# Patient Record
Sex: MALE | Race: OTHER | HISPANIC OR LATINO | URBAN - METROPOLITAN AREA
[De-identification: names, ages, dates, MRNs, and addresses within clinical notes are randomized per-mention and may not be internally consistent; named-entity substitution may affect disease eponyms.]

---

## 2017-08-09 ENCOUNTER — EMERGENCY (EMERGENCY)
Facility: HOSPITAL | Age: 27
LOS: 1 days | Discharge: HOME | End: 2017-08-09
Attending: EMERGENCY MEDICINE | Admitting: EMERGENCY MEDICINE
Payer: SELF-PAY

## 2017-08-09 VITALS
HEART RATE: 90 BPM | OXYGEN SATURATION: 98 % | TEMPERATURE: 98 F | SYSTOLIC BLOOD PRESSURE: 140 MMHG | DIASTOLIC BLOOD PRESSURE: 80 MMHG | RESPIRATION RATE: 18 BRPM

## 2017-08-09 PROCEDURE — 99284 EMERGENCY DEPT VISIT MOD MDM: CPT

## 2017-08-09 NOTE — ED ADULT TRIAGE NOTE - CHIEF COMPLAINT QUOTE
p/t c/o of cyst to lt side for many years here for eval c/o of discomfort on cyst area for past few days nad noted

## 2017-08-30 ENCOUNTER — EMERGENCY (EMERGENCY)
Facility: HOSPITAL | Age: 27
LOS: 1 days | Discharge: HOME | End: 2017-08-30
Attending: EMERGENCY MEDICINE | Admitting: EMERGENCY MEDICINE
Payer: SELF-PAY

## 2017-08-30 VITALS
OXYGEN SATURATION: 99 % | TEMPERATURE: 98 F | RESPIRATION RATE: 18 BRPM | HEART RATE: 62 BPM | DIASTOLIC BLOOD PRESSURE: 71 MMHG | SYSTOLIC BLOOD PRESSURE: 132 MMHG

## 2017-08-30 PROCEDURE — 99284 EMERGENCY DEPT VISIT MOD MDM: CPT

## 2017-08-30 RX ORDER — IBUPROFEN 200 MG
600 TABLET ORAL ONCE
Qty: 0 | Refills: 0 | Status: COMPLETED | OUTPATIENT
Start: 2017-08-30 | End: 2017-08-30

## 2017-08-30 RX ADMIN — Medication 600 MILLIGRAM(S): at 17:12

## 2017-08-30 NOTE — ED PROVIDER NOTE - CARE PLAN
Principal Discharge DX:	Lump  Instructions for follow-up, activity and diet:	Take Ibuprofen 600mg (three over the counter pills) every 8 hours as needed for pain. Follow up with Surgery this week, referral list given. You may also follow up at our clinic [phone]. Return to ER for any new or worsening symptoms, fever, chills, redness, discharge, chest pain, shortness of breathe or any other concerns. Principal Discharge DX:	Lump  Instructions for follow-up, activity and diet:	Take Ibuprofen 600mg (three over the counter pills) every 8 hours as needed for pain. Follow up with Surgery this week, referral list given. You may also follow up at our clinic [phone]. Return to ER for any new or worsening symptoms, fever, chills, redness, discharge, chest pain, shortness of breathe or any other concerns.

## 2017-08-30 NOTE — ED PROVIDER NOTE - MEDICAL DECISION MAKING DETAILS
27 y/o male with no pmhx presents to ED with "lump" on right lower back x 1 month. possible fibroma vs lipoma. plan: ibuprofen, surgery outpt follow up

## 2017-08-30 NOTE — ED ADULT TRIAGE NOTE - CHIEF COMPLAINT QUOTE
c/o of constant pain and itchiness on left mid trunk lump for past month.  Area swollen, tender to touch, skin normal tone.  Endorses lifting heavy object at work one month ago and noticed the lump.  Denies trauma. Well appearing, NAD at this time.

## 2017-08-30 NOTE — ED PROVIDER NOTE - SKIN COLOR
+1.5 inch firm mass below posterior last rib, no redness/swelling or increased warmth, no discharge or pus, no abscess/normal for race

## 2017-08-30 NOTE — ED PROVIDER NOTE - PLAN OF CARE
Take Ibuprofen 600mg (three over the counter pills) every 8 hours as needed for pain. Follow up with Surgery this week, referral list given. You may also follow up at our clinic [phone]. Return to ER for any new or worsening symptoms, fever, chills, redness, discharge, chest pain, shortness of breathe or any other concerns.

## 2017-08-30 NOTE — ED PROVIDER NOTE - ATTENDING CONTRIBUTION TO CARE
[place]urto  pt with 1 month of panful swelling below lower left costal margin  atraumatic  exam  small mobile swelling below  ribs on left  area not fluctuant or with overlying erythema   exam c/w fibroma   will be referred for nonemergent surgical follow up

## 2017-08-30 NOTE — ED PROVIDER NOTE - OBJECTIVE STATEMENT
Lao speaking declined interpretation services, family member at bedside translated:    25 y/o male with no pmhx presents to ED with "lump" on right lower back x 1 month. Pt states he does not have a pcp and so came here. Endorsing mild pain and tender to touch. No fever, chills, redness, itchiness, warmth, discharge/pus/abscess, previous lumps, more lumps, cp, sob, cough, weakness, numbness, tingling, or any other concerns.

## 2018-02-05 ENCOUNTER — EMERGENCY (EMERGENCY)
Facility: HOSPITAL | Age: 28
LOS: 1 days | Discharge: HOME | End: 2018-02-05
Attending: EMERGENCY MEDICINE | Admitting: EMERGENCY MEDICINE
Payer: SELF-PAY

## 2018-02-05 VITALS
TEMPERATURE: 98 F | OXYGEN SATURATION: 100 % | SYSTOLIC BLOOD PRESSURE: 152 MMHG | HEART RATE: 73 BPM | DIASTOLIC BLOOD PRESSURE: 80 MMHG | RESPIRATION RATE: 16 BRPM

## 2018-02-05 PROCEDURE — 99283 EMERGENCY DEPT VISIT LOW MDM: CPT

## 2018-02-05 PROCEDURE — 71101 X-RAY EXAM UNILAT RIBS/CHEST: CPT | Mod: 26,LT

## 2018-02-05 RX ORDER — IBUPROFEN 200 MG
600 TABLET ORAL ONCE
Qty: 0 | Refills: 0 | Status: COMPLETED | OUTPATIENT
Start: 2018-02-05 | End: 2018-02-05

## 2018-02-05 RX ADMIN — Medication 600 MILLIGRAM(S): at 22:42

## 2018-02-05 NOTE — ED PROVIDER NOTE - CARE PLAN
Principal Discharge DX:	Musculoskeletal pain Principal Discharge DX:	Musculoskeletal pain  Assessment and plan of treatment:	- Follow up with your primary care doctor in 1-2 days.    - Take tylenol 650mg or motrin 600mg every 6 hours as needed for pain.   - Return to the ED for new or worsening symptoms.

## 2018-02-05 NOTE — ED PROVIDER NOTE - PLAN OF CARE
- Follow up with your primary care doctor in 1-2 days.    - Take tylenol 650mg or motrin 600mg every 6 hours as needed for pain.   - Return to the ED for new or worsening symptoms.

## 2018-02-05 NOTE — ED PROVIDER NOTE - CARDIAC, MLM
Normal rate, regular rhythm.  Heart sounds S1, S2.  No murmurs, rubs or gallops. +L-sided anterior lower rib pain, no pain above or below rib.

## 2018-02-05 NOTE — ED PROVIDER NOTE - ATTENDING CONTRIBUTION TO CARE
27m presents with lower rib pain. 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 No associated cp or sob, no abd pain or vomiting, no diarrhea or dysuria, no leg swelling or recent travel. Patient offered  but prefers family to translate.  exam  GEN - NAD; well appearing; A+O x3   HEAD - NC/AT   EYES- PERRL, EOMI  ENT: Airway patent, mmm, Oral cavity and pharynx normal. No inflammation, swelling, exudate, or lesions.  NECK: Neck supple, non-tender without lymphadenopathy, no masses.  PULMONARY - CTA b/l, symmetric breath sounds.   CARDIAC -s1s2, RRR, no M,G,R  ABDOMEN - +BS, ND, NT, soft, no guarding, no rebound, no masses   BACK - no CVA tenderness, Normal  spine   +ttp along lower ribs and chest muscles.  EXTREMITIES - FROM, symmetric pulses, capillary refill < 2 seconds, no edema   SKIN - no rash or bruising   NEUROLOGIC - alert, speech clear, no focal deficits  PSYCH -nl mood/affect, nl insight.  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 27m presents with lower rib pain. 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 No associated cp or sob, no abd pain or vomiting, no diarrhea or dysuria, no leg swelling or recent travel. Patient offered  but prefers family to translate.  exam  GEN - NAD; well appearing; A+O x3   HEAD - NC/AT   EYES- PERRL, EOMI  ENT: Airway patent, mmm, Oral cavity and pharynx normal. No inflammation, swelling, exudate, or lesions.  NECK: Neck supple, non-tender without lymphadenopathy, no masses.  PULMONARY - CTA b/l, symmetric breath sounds.   CARDIAC -s1s2, RRR, no M,G,R  ABDOMEN - +BS, ND, NT, soft, no guarding, no rebound, no masses   BACK - no CVA tenderness, Normal  spine   +ttp along lower ribs and chest muscles.  EXTREMITIES - FROM, symmetric pulses, capillary refill < 2 seconds, no edema   SKIN - no rash or bruising   NEUROLOGIC - alert, speech clear, no focal deficits  PSYCH -nl mood/affect, nl insight.  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

## 2018-02-05 NOTE — ED PROVIDER NOTE - OBJECTIVE STATEMENT
Offered  services, patient prefers family to translate.   27M no PMH p/w L sided lower anterior rib pain since yesterday morning, worse with movement or palpation. Endorses nausea. Denies vomiting, strenuous activity, fever, diarrhea, trauma, injuries, leg pain, leg swelling, travel, abdominal surgery, rash. Offered  services, patient prefers family to translate.   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 Endorses nausea. Denies vomiting, strenuous activity, fever, diarrhea, trauma, injuries, leg pain, leg swelling, travel, abdominal surgery, rash. Offered  services, patient prefers family to translate.   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 Endorses nausea. Denies vomiting, strenuous activity, fever, diarrhea, trauma, injuries, leg pain, leg swelling, travel, abdominal surgery, rash.

## 2018-07-15 ENCOUNTER — EMERGENCY (EMERGENCY)
Facility: HOSPITAL | Age: 28
LOS: 1 days | Discharge: HOME | End: 2018-07-15
Attending: EMERGENCY MEDICINE | Admitting: EMERGENCY MEDICINE
Payer: SELF-PAY

## 2018-07-15 VITALS
OXYGEN SATURATION: 100 % | TEMPERATURE: 98 F | SYSTOLIC BLOOD PRESSURE: 144 MMHG | RESPIRATION RATE: 16 BRPM | HEART RATE: 77 BPM | DIASTOLIC BLOOD PRESSURE: 91 MMHG

## 2018-07-15 PROCEDURE — 99053 MED SERV 10PM-8AM 24 HR FAC: CPT

## 2018-07-15 PROCEDURE — 99283 EMERGENCY DEPT VISIT LOW MDM: CPT | Mod: 25

## 2018-07-15 RX ORDER — PENICILLIN G BENZATHINE 1200000 [IU]/2ML
1.2 INJECTION, SUSPENSION INTRAMUSCULAR ONCE
Qty: 0 | Refills: 0 | Status: COMPLETED | OUTPATIENT
Start: 2018-07-15 | End: 2018-07-15

## 2018-07-15 RX ORDER — DEXAMETHASONE 0.5 MG/5ML
10 ELIXIR ORAL ONCE
Qty: 0 | Refills: 0 | Status: COMPLETED | OUTPATIENT
Start: 2018-07-15 | End: 2018-07-15

## 2018-07-16 RX ADMIN — Medication 10 MILLIGRAM(S): at 00:02

## 2018-07-16 RX ADMIN — PENICILLIN G BENZATHINE 1.2 MILLION UNIT(S): 1200000 INJECTION, SUSPENSION INTRAMUSCULAR at 00:02

## 2018-07-16 NOTE — ED PROVIDER NOTE - OBJECTIVE STATEMENT
28 yo otherwise healthy with three days of a progressively worsening sore throat.  WOrse with swallowing.  Hard to eat due to pain.  No problems breathing and per his friend in room no voice changes.  Subjective fevers, +HA and no coryza

## 2022-01-31 NOTE — ED PROVIDER NOTE - CARDIAC, MLM
Order signed off as denied.   Normal rate, regular rhythm.  Heart sounds S1, S2.  No murmurs, rubs or gallops.

## 2024-10-16 NOTE — ED PROVIDER NOTE - DISPOSITION TYPE
Care Management Initial Consult    General Information  Assessment completed with: Patient,    Type of CM/SW Visit: Initial Assessment    Primary Care Provider verified and updated as needed: Yes   Readmission within the last 30 days: no previous admission in last 30 days      Reason for Consult: discharge planning  Advance Care Planning: Advance Care Planning Reviewed: other (see comments)  Patient does have a HCD thru [facility]  General Information Comments: High readmission risk    Communication Assessment  Patient's communication style: spoken language (English or Bilingual)    Hearing Difficulty or Deaf: yes   Wear Glasses or Blind: yes    Cognitive  Cognitive/Neuro/Behavioral: WDL                      Living Environment:   People in home: alone     Current living Arrangements: apartment      Able to return to prior arrangements: yes  Living Arrangement Comments: Moved into apartment one week ago    Family/Social Support:  Care provided by: self  Provides care for: no one  Marital Status:   Support system: Children          Description of Support System: Supportive    Support Assessment: Lacks necessary supervision and assistance, Limited social contact and support    Current Resources:   Patient receiving home care services: No        Community Resources: Dialysis Services, DME  Equipment currently used at home: cane, quad, glucometer  Supplies currently used at home: Diabetic Supplies, Other (Dexcom 7 CGM, has a CPAP but has not been able to use due to PD)    Employment/Financial:  Employment Status: disabled        Financial Concerns: none           Does the patient's insurance plan have a 3 day qualifying hospital stay waiver?  No    Lifestyle & Psychosocial Needs:  Social Determinants of Health     Food Insecurity: Low Risk  (10/15/2024)    Food Insecurity     Within the past 12 months, did you worry that your food would run out before you got money to buy more?: No     Within the past 12 months,  did the food you bought just not last and you didn t have money to get more?: No   Depression: Not at risk (8/12/2024)    PHQ-2     PHQ-2 Score: 0   Housing Stability: Low Risk  (10/15/2024)    Housing Stability     Do you have housing? : Yes     Are you worried about losing your housing?: No   Tobacco Use: Medium Risk (10/15/2024)    Patient History     Smoking Tobacco Use: Former     Smokeless Tobacco Use: Never     Passive Exposure: Not on file   Financial Resource Strain: Low Risk  (10/15/2024)    Financial Resource Strain     Within the past 12 months, have you or your family members you live with been unable to get utilities (heat, electricity) when it was really needed?: No   Alcohol Use: Unknown (11/28/2018)    [facility]    AUDIT-C     Frequency of Alcohol Consumption: Monthly or less     Average Number of Drinks: Not on file     Frequency of Binge Drinking: Never   Transportation Needs: Low Risk  (10/15/2024)    Transportation Needs     Within the past 12 months, has lack of transportation kept you from medical appointments, getting your medicines, non-medical meetings or appointments, work, or from getting things that you need?: No   Physical Activity: Unknown (10/8/2024)    Exercise Vital Sign     Days of Exercise per Week: 0 days     Minutes of Exercise per Session: Not on file   Interpersonal Safety: Low Risk  (10/15/2024)    Interpersonal Safety     Do you feel physically and emotionally safe where you currently live?: Yes     Within the past 12 months, have you been hit, slapped, kicked or otherwise physically hurt by someone?: No     Within the past 12 months, have you been humiliated or emotionally abused in other ways by your partner or ex-partner?: No   Stress: No Stress Concern Present (10/8/2024)    [place] citizen [place] [employer] - Occupational Stress Questionnaire     Feeling of Stress :  Only a little   Social Connections: Unknown (10/8/2024)    Social Connection and Isolation Panel [NHANES]     Frequency of Communication with Friends and Family: Not on file     Frequency of Social Gatherings with Friends and Family: Patient declined     Attends Latter-day Services: Not on file     Active Member of Clubs or Organizations: Not on file     Attends Club or Organization Meetings: Not on file     Marital Status: Not on file   Health Literacy: Not on file       Functional Status:  Prior to admission patient needed assistance:   Dependent ADLs:: Ambulation-cane  Dependent IADLs:: Independent       Mental Health Status:  Mental Health Status: No Current Concerns       Chemical Dependency Status:  Chemical Dependency Status: No Current Concerns             Values/Beliefs:  Spiritual, Cultural Beliefs, Latter-day Practices, Values that affect care: no               Discussed  Partnership in Safe Discharge Planning  document with patient/family: No    Additional Information:  Care Coordinator met with patient and introduced self and role.  Patient had been living with his son and DIL since he was hospitalized in May.  Prior to this, patient lived in a house in [place].  Patient moved into an independent apartment one week ago.  Patient is disabled and on peritoneal dialysis.  Patient's dialysis [facility].  Patient shares that the 6L PD bags for the cycler are becoming too difficult for him to manage.  Patient reports for now he will need to manage on his own.  He is planning to hire a private duty PCA that would be able to help him a few hours per day.  He does not want to go thru an agency due to the cost.   Patient does not want to go back to manual exchanges that would have 2L bags.  [facility] to bring understanding if there was an alternative to these large bags and conversed with PD Nurse Renita ([phone]).  She knows patient and will check if  somehow they would be able to switch to the 3L bags with his cycler.      Patient has a history of TALI and has a CPAP, but unable to use due to the PD.  He has not used the CPAP for the past year.  Patient is planning to look into maybe getting the Inspire implanted device.  Patient is on Coumadin and INRs are managed by the [facility].  Patient reports good management of his diabetes and uses a Dexcom 7 CGM that is covered by his insurance.  Patient has a history of CHF.  He tries to follow a low salt diet and weighs himself weekly.  Patient follows with Cardiology and Nephrology thru [facility].  Patient established primary care last week with Dr [facility] [place].  Patient has a Podiatry appointment with Dr Siddiqui S[place] on 10/21.  This may need to be cancelled pending further recs.  [facility]  He thought he had this on his phone, but unable to find.    At the present time, discharge plan is home.  It is currently unknown if he will require any home care.  Patient would like help in scheduling PCP and INR follow up prior to discharge and reports one of his children will provide transportation home.    Next Steps:   Care Management is following  Need to schedule PCP and INR prior to discharge    Aminata Munoz RN  Inpatient Care Management  [phone]      DISCHARGE